# Patient Record
Sex: FEMALE | Race: BLACK OR AFRICAN AMERICAN | NOT HISPANIC OR LATINO | URBAN - METROPOLITAN AREA
[De-identification: names, ages, dates, MRNs, and addresses within clinical notes are randomized per-mention and may not be internally consistent; named-entity substitution may affect disease eponyms.]

---

## 2018-05-29 ENCOUNTER — EMERGENCY (EMERGENCY)
Facility: HOSPITAL | Age: 23
LOS: 1 days | Discharge: HOME | End: 2018-05-29
Attending: EMERGENCY MEDICINE | Admitting: EMERGENCY MEDICINE
Payer: MEDICAID

## 2018-05-29 VITALS
RESPIRATION RATE: 18 BRPM | TEMPERATURE: 99 F | SYSTOLIC BLOOD PRESSURE: 127 MMHG | HEART RATE: 72 BPM | OXYGEN SATURATION: 100 % | WEIGHT: 113.1 LBS | DIASTOLIC BLOOD PRESSURE: 80 MMHG

## 2018-05-29 DIAGNOSIS — R10.9 UNSPECIFIED ABDOMINAL PAIN: ICD-10-CM

## 2018-05-29 DIAGNOSIS — N83.202 UNSPECIFIED OVARIAN CYST, LEFT SIDE: ICD-10-CM

## 2018-05-29 DIAGNOSIS — R11.0 NAUSEA: ICD-10-CM

## 2018-05-29 LAB
APPEARANCE UR: CLEAR — SIGNIFICANT CHANGE UP
BACTERIA # UR AUTO: PRESENT /HPF
BILIRUB UR-MCNC: NEGATIVE — SIGNIFICANT CHANGE UP
COLOR SPEC: YELLOW — SIGNIFICANT CHANGE UP
COMMENT - URINE: SIGNIFICANT CHANGE UP
DIFF PNL FLD: NEGATIVE — SIGNIFICANT CHANGE UP
EPI CELLS # UR: SIGNIFICANT CHANGE UP /HPF
GLUCOSE UR QL: NEGATIVE — SIGNIFICANT CHANGE UP
HCG UR QL: NEGATIVE — SIGNIFICANT CHANGE UP
KETONES UR-MCNC: 15 MG/DL
LEUKOCYTE ESTERASE UR-ACNC: NEGATIVE — SIGNIFICANT CHANGE UP
NITRITE UR-MCNC: NEGATIVE — SIGNIFICANT CHANGE UP
PH UR: 6 — SIGNIFICANT CHANGE UP (ref 5–8)
PROT UR-MCNC: (no result) MG/DL
RBC CASTS # UR COMP ASSIST: < 5 /HPF — SIGNIFICANT CHANGE UP
SP GR SPEC: >=1.03 — SIGNIFICANT CHANGE UP (ref 1–1.03)
UROBILINOGEN FLD QL: 0.2 E.U./DL — SIGNIFICANT CHANGE UP
WBC UR QL: < 5 /HPF — SIGNIFICANT CHANGE UP

## 2018-05-29 PROCEDURE — 76856 US EXAM PELVIC COMPLETE: CPT | Mod: 26

## 2018-05-29 PROCEDURE — 99284 EMERGENCY DEPT VISIT MOD MDM: CPT

## 2018-05-29 RX ORDER — ACETAMINOPHEN 500 MG
650 TABLET ORAL ONCE
Qty: 0 | Refills: 0 | Status: COMPLETED | OUTPATIENT
Start: 2018-05-29 | End: 2018-05-29

## 2018-05-29 RX ADMIN — Medication 650 MILLIGRAM(S): at 16:41

## 2018-05-29 NOTE — ED ADULT TRIAGE NOTE - CHIEF COMPLAINT QUOTE
c/o right sided abdominal pain since last night with nausea. pt had UTI one month ago, did not finish medications due to drinking. h/o ovarian cyst, UTI. denies vomiting, diarrhea/constipation, dysuria.  pt appears comfortable, in NAD.

## 2018-05-29 NOTE — ED PROVIDER NOTE - OBJECTIVE STATEMENT
22 y/o F presents to the ED c/o left sided abdominal pain since last night. Pt states she was walking and experienced left sided abdominal pain, described as intermittent in timing and stiff in quality. She notes associated symptoms of nausea and migraines. Denies any vomiting, fevers, chills. Reports that she ate a salad last night. States that she had a UTI infection a month ago and was given antibiotics but did not complete them as she was drinking etoh. Pt also had a CT done at [facility] and was told that she had an ovarian cyst (did not recall which side), was given medications. 22 y/o F with a MHx of ovarian cysts presents to the ED c/o left sided abdominal/pelvic pain since last night. Pt states she was walking today and experienced left sided pelvic, described as intermittent in timing and stiff in quality. She notes associated symptoms of nausea and migraines. Denies any vomiting, fevers, chills. Reports that she ate a salad last night. States that she had a UTI infection a month ago and was given antibiotics but did not complete them as she was drinking EtOH. Pt also had a CT done at [facility] and was told that she had an ovarian cyst, did not recall which side. 22 y/o F with a MHx of ovarian cysts presents to the ED c/o left sided abdominal/pelvic pain since last night. Pt states she was walking today and experienced left sided pelvic, described as intermittent in timing and stiff in quality. She notes associated symptoms of nausea and migraines. Denies any vomiting, fevers, chills. Reports that she ate a salad last night. States that she had a UTI infection a month ago and was given antibiotics but did not complete them as she was drinking EtOH. Pt also had a CT done at [facility] last month and was told that she had an ovarian cyst, was given medications. 24 y/o F with a MHx of ovarian cysts presents to the ED c/o left sided abdominal/pelvic pain since last night. Pt states she was walking today and experienced left sided pelvic, described as intermittent in timing and stiff in quality. She notes associated symptoms of nausea and migraines. Denies any vomiting, fevers, chills. Reports that she ate a salad last night. States that she had a UTI infection a month ago and was given antibiotics but did not complete them as she was drinking EtOH.

## 2018-09-20 ENCOUNTER — EMERGENCY (EMERGENCY)
Facility: HOSPITAL | Age: 23
LOS: 1 days | Discharge: HOME | End: 2018-09-20
Admitting: EMERGENCY MEDICINE
Payer: MEDICAID

## 2018-09-20 VITALS
DIASTOLIC BLOOD PRESSURE: 78 MMHG | RESPIRATION RATE: 17 BRPM | OXYGEN SATURATION: 99 % | SYSTOLIC BLOOD PRESSURE: 111 MMHG | HEART RATE: 82 BPM | TEMPERATURE: 98 F

## 2018-09-20 VITALS
RESPIRATION RATE: 18 BRPM | OXYGEN SATURATION: 100 % | WEIGHT: 115.08 LBS | HEART RATE: 95 BPM | SYSTOLIC BLOOD PRESSURE: 123 MMHG | TEMPERATURE: 98 F | DIASTOLIC BLOOD PRESSURE: 74 MMHG

## 2018-09-20 PROCEDURE — 93971 EXTREMITY STUDY: CPT | Mod: 26,LT

## 2018-09-20 PROCEDURE — 99284 EMERGENCY DEPT VISIT MOD MDM: CPT

## 2018-09-20 RX ORDER — IBUPROFEN 200 MG
600 TABLET ORAL ONCE
Qty: 0 | Refills: 0 | Status: COMPLETED | OUTPATIENT
Start: 2018-09-20 | End: 2018-09-20

## 2018-09-20 RX ADMIN — Medication 600 MILLIGRAM(S): at 20:17

## 2018-09-20 NOTE — ED PROVIDER NOTE - OBJECTIVE STATEMENT
Pt is 22 yo female with no sig pmhx who presents with left leg pain x 1 year worse x 1 week.  Pt denies any injuries, falls.  Pt denies any swelling, n/v, chest pain, sob, numbness, or weakness. Pt is ambulatory.  Pt reports pain radiating to ankle.  Pt reports pain worse with walking and when she has her bf massage her feet.  Pt denies any bcp use, smoking hx, recent travel, or surgical hx.

## 2018-09-20 NOTE — ED PROVIDER NOTE - MUSCULOSKELETAL, MLM
Spine appears normal, range of motion is not limited, no muscle or joint tenderness; no leg swelling, + tenderness to left calf and anterior shin, distally NVi

## 2018-09-20 NOTE — ED PROVIDER NOTE - CARDIAC, MLM
Normal rate, regular rhythm.  Heart sounds S1, S2.  No murmurs, rubs or gallops. distal pedal pulses 2+ and equal b/l

## 2018-09-21 PROBLEM — N83.209 UNSPECIFIED OVARIAN CYST, UNSPECIFIED SIDE: Chronic | Status: ACTIVE | Noted: 2018-05-30

## 2018-09-24 DIAGNOSIS — M79.662 PAIN IN LEFT LOWER LEG: ICD-10-CM

## 2018-09-24 DIAGNOSIS — Z79.1 LONG TERM (CURRENT) USE OF NON-STEROIDAL ANTI-INFLAMMATORIES (NSAID): ICD-10-CM

## 2018-10-15 ENCOUNTER — EMERGENCY (EMERGENCY)
Facility: HOSPITAL | Age: 23
LOS: 1 days | Discharge: HOME | End: 2018-10-15
Admitting: EMERGENCY MEDICINE
Payer: MEDICAID

## 2018-10-15 VITALS
SYSTOLIC BLOOD PRESSURE: 124 MMHG | DIASTOLIC BLOOD PRESSURE: 81 MMHG | RESPIRATION RATE: 16 BRPM | HEART RATE: 88 BPM | OXYGEN SATURATION: 100 % | TEMPERATURE: 98 F

## 2018-10-15 DIAGNOSIS — T73.0XXA STARVATION, INITIAL ENCOUNTER: ICD-10-CM

## 2018-10-15 DIAGNOSIS — R11.0 NAUSEA: ICD-10-CM

## 2018-10-15 DIAGNOSIS — Z79.1 LONG TERM (CURRENT) USE OF NON-STEROIDAL ANTI-INFLAMMATORIES (NSAID): ICD-10-CM

## 2018-10-15 LAB
APPEARANCE UR: CLEAR — SIGNIFICANT CHANGE UP
BILIRUB UR-MCNC: NEGATIVE — SIGNIFICANT CHANGE UP
COLOR SPEC: YELLOW — SIGNIFICANT CHANGE UP
DIFF PNL FLD: NEGATIVE — SIGNIFICANT CHANGE UP
GLUCOSE UR QL: NEGATIVE — SIGNIFICANT CHANGE UP
HCG UR QL: NEGATIVE — SIGNIFICANT CHANGE UP
KETONES UR-MCNC: NEGATIVE — SIGNIFICANT CHANGE UP
LEUKOCYTE ESTERASE UR-ACNC: NEGATIVE — SIGNIFICANT CHANGE UP
NITRITE UR-MCNC: NEGATIVE — SIGNIFICANT CHANGE UP
PH UR: 6 — SIGNIFICANT CHANGE UP (ref 5–8)
PROT UR-MCNC: NEGATIVE MG/DL — SIGNIFICANT CHANGE UP
SP GR SPEC: >=1.03 — SIGNIFICANT CHANGE UP (ref 1–1.03)
UROBILINOGEN FLD QL: 0.2 E.U./DL — SIGNIFICANT CHANGE UP

## 2018-10-15 PROCEDURE — 99283 EMERGENCY DEPT VISIT LOW MDM: CPT | Mod: 25

## 2018-10-15 NOTE — ED ADULT TRIAGE NOTE - CHIEF COMPLAINT QUOTE
pt ambulatory, states she has not had her menstrual period for a week now; had negative u-pregs at home. Also reports mild burning in urination.

## 2018-10-16 NOTE — ED PROVIDER NOTE - OBJECTIVE STATEMENT
Pt is 22 yo female with no sig pmhx who presents stating she is 1 week late on her menstrual period and has been having various symptoms such as feeling nausea with no vomiting, but also reports feeling increased hunger and states she feels like she is eating all the time. Pt denies any abdominal pain, fevers, chills, vomiting, or diarrhea.  Pt reports taking 2 home pregnancy tests which were negative.  Pt reports also with 1 day of "stuffy nose".  denies any cough, chest pain, sob, pelvic pain, vaginal spotting or discharge.  Pt reports is sexually active with 1 male partner and denies any hx of std, and with recent general visit with her GYN which was normal she states.

## 2018-10-16 NOTE — ED PROVIDER NOTE - MEDICAL DECISION MAKING DETAILS
pt presents aprox 5 days late on her period by dates; requesting pregnancy test.  pt also with nausea however reports also with increased appetite.  Pt with no other worrisome symptoms, no urinary symptoms and no discharge. advised close pmd, gyn f/u.  urinalysis neg and urine pregnancy neg

## 2018-10-17 LAB
C TRACH RRNA SPEC QL NAA+PROBE: SIGNIFICANT CHANGE UP
CULTURE RESULTS: SIGNIFICANT CHANGE UP
N GONORRHOEA RRNA SPEC QL NAA+PROBE: SIGNIFICANT CHANGE UP
SPECIMEN SOURCE: SIGNIFICANT CHANGE UP
SPECIMEN SOURCE: SIGNIFICANT CHANGE UP

## 2018-11-06 NOTE — ED ADULT NURSE NOTE - DOES PATIENT HAVE ADVANCE DIRECTIVE
Scheduled with dr Sussy Avelar on 11/14 at 2:00 pm for syncope and palpitations    Cardiac hx scanned  Referral complete No

## 2021-01-19 NOTE — ED PROVIDER NOTE - CARDIAC, MLM
Stable  Will continue to monitor  Continue following with cardiology annually  Normal rate, regular rhythm.  Heart sounds S1, S2.  No murmurs, rubs or gallops.

## 2023-06-23 NOTE — ED ADULT NURSE NOTE - PT NEEDS ASSIST
Arrived to the [facility]. 2 liters NS bolus completed as per patient request. Patient tolerated well. Any issues or concerns during appointment: none. Patient aware of next infusion appointment on 6/30 (date) at 7:15AM (time). Patient instructed to call provider with temperature of 100.4 or greater or nausea/vomiting/ diarrhea or pain not controlled by medications  Discharged ambulatory. no

## 2023-07-10 NOTE — ED PROVIDER NOTE - PSH
July 10, 2023        Danielle Sanchez  [address] [id number]  [place] [address]    Dear Danielle,        It was a pleasure speaking with you.  Your health and wellness have never been more important; congratulations on taking the next step in managing your health!  Thank you for scheduling your virtual visit with one of our Nurse Practitioners Elsie Avila  from [facility] on Tuesday 8/8/23 at 11:30am.     If you need to cancel or reschedule your appointment, please call us at [phone].    Be well!  Your health care partners at [facility]     No significant past surgical history